# Patient Record
(demographics unavailable — no encounter records)

---

## 2025-06-17 NOTE — DISCUSSION/SUMMARY
[FreeTextEntry1] : 39M h/o GERD presented to [facility] on 6/13/25 with intermittent L-sided chest pain for few days, lab work with serial Troponin normal as well as EKG and CXR, presents for cardiology evaluation.   Recent recurring nonexertional chest pain no significant personal risk factors and repeat EKG again normal, will obtain exercise treadmill stress test to assess if any exertional chest pain or abnormal EKG that may warrant to get cardiac CTA, also need Echocardiogram to assess structural heart function. Check lipid panel and A1c level  GERD-on PPI PRN, pending GI eval, no cardiac contraindication for EGD under anesthesia if EST is normal.    Follow up pending the result of the above tests.  [EKG obtained to assist in diagnosis and management of assessed problem(s)] : EKG obtained to assist in diagnosis and management of assessed problem(s)

## 2025-06-17 NOTE — DISCUSSION/SUMMARY
[FreeTextEntry1] : 39M h/o GERD presented to [facility] on 6/13/25 with intermittent L-sided chest pain for few days, lab work with serial Troponin normal as well as EKG and CXR, presents for cardiology evaluation.   Recent recurring nonexertional chest pain no significant personal risk factors and repeat EKG again normal, will obtain exercise treadmill stress test to assess if any exertional chest pain or abnormal EKG that may warrant to get cardiac CTA, also need Echocardiogram to assess structural heart function. Check lipid panel and A1c level  GERD-on PPI PRN, pending GI eval, no cardiac contraindication for EGD under anesthesia if EST is normal.    Follow up pending the result of the above tests.  [EKG obtained to assist in diagnosis and management of assessed problem(s)] : EKG obtained to assist in diagnosis and management of assessed problem(s)

## 2025-06-18 NOTE — HISTORY OF PRESENT ILLNESS
[FreeTextEntry1] : Mr. Merritt is a 39-year-old gentleman who presents for evaluation of new onset (7 days) of atypical substernal chest pain. Seen by Cardiology, work up negative to date, awaiting stress test Friday (6/19). Significant family history of coronary disease. Reports chronic intermittent indigestion / epigastric burning consistent with chronic dyspepsia or GERD. Admits to possible underlying dysphagia characterized by food feeling stuck. Never had an endoscopy in the past. In office today to discuss further evaluation.

## 2025-06-18 NOTE — PLAN
[TextEntry] : Sodium alginate for break through symptoms  Continue OTC Nexium - effectively controlling intermittent reflux  If upper endoscopy unrevealing will consider pH testing

## 2025-06-18 NOTE — PHYSICAL EXAM
[Alert] : alert [Healthy Appearing] : healthy appearing [No Acute Distress] : no acute distress [Sclera] : the sclera and conjunctiva were normal [Hearing Threshold Finger Rub Not [place]] : hearing was normal [Oropharynx] : the oropharynx was normal [Normal Appearance] : the appearance of the neck was normal [No Respiratory Distress] : no respiratory distress [No Acc Muscle Use] : no accessory muscle use [Respiration, Rhythm And Depth] : normal respiratory rhythm and effort [Heart Rate And Rhythm] : heart rate was normal and rhythm regular [Abdomen Tenderness] : non-tender [No Masses] : no abdominal mass palpated [Abdomen Soft] : soft [] : no hepatosplenomegaly [Oriented To Time, Place, And Person] : oriented to person, place, and time

## 2025-06-18 NOTE — ASSESSMENT
[FreeTextEntry1] : Mr. Merritt is a 39-year-old gentleman who presents for evaluation of new onset (7 days) of atypical substernal chest pain. Seen by Cardiology, work up negative to date, awaiting stress test Friday (6/19). Significant family history of coronary disease. Reports chronic intermittent indigestion / epigastric burning consistent with chronic dyspepsia or GERD. Admits to possible underlying dysphagia characterized by food feeling stuck. Never had an endoscopy in the past. In office today to discuss further evaluation. Will plan for upper endoscopy evaluation. Risks, benefits and alternatives discussed in detail. Patient medically optimized and agreeable to proceed with planned procedure.

## 2025-07-10 NOTE — HISTORY OF PRESENT ILLNESS
[FreeTextEntry1] : Mr. Merritt is a 40 year old gentleman who presents for upper endoscopy in the setting of chronic epigastric pain / dyspepsia not responsive to acid suppression.

## 2025-07-10 NOTE — ASSESSMENT
[FreeTextEntry1] : Mr. Merritt is a 40-year-old gentleman who presents for upper endoscopy in the setting of chronic epigastric pain / atypical chest pain / dyspepsia not responsive to acid suppression. Intermittent dysphagia symptoms. Risks, benefits and alternatives discussed in detail. Patient medically optimized and agreeable to proceed with planned procedure.

## 2025-07-10 NOTE — PHYSICAL EXAM
[Alert] : alert [Healthy Appearing] : healthy appearing [No Acute Distress] : no acute distress [Sclera] : the sclera and conjunctiva were normal [Hearing Threshold Finger Rub Not [place]] : hearing was normal [Oropharynx] : the oropharynx was normal [Normal Appearance] : the appearance of the neck was normal [No Respiratory Distress] : no respiratory distress [No Acc Muscle Use] : no accessory muscle use [Respiration, Rhythm And Depth] : normal respiratory rhythm and effort [Heart Rate And Rhythm] : heart rate was normal and rhythm regular [Abdomen Tenderness] : non-tender [No Masses] : no abdominal mass palpated [Abdomen Soft] : soft [] : no hepatosplenomegaly [Oriented To Time, Place, And Person] : oriented to person, place, and time